# Patient Record
Sex: FEMALE
[De-identification: names, ages, dates, MRNs, and addresses within clinical notes are randomized per-mention and may not be internally consistent; named-entity substitution may affect disease eponyms.]

---

## 2023-03-30 PROBLEM — Z00.00 ENCOUNTER FOR PREVENTIVE HEALTH EXAMINATION: Status: ACTIVE | Noted: 2023-03-30

## 2023-04-13 ENCOUNTER — APPOINTMENT (OUTPATIENT)
Dept: OTOLARYNGOLOGY | Facility: CLINIC | Age: 68
End: 2023-04-13
Payer: MEDICARE

## 2023-04-13 VITALS
HEIGHT: 66 IN | TEMPERATURE: 98 F | DIASTOLIC BLOOD PRESSURE: 103 MMHG | SYSTOLIC BLOOD PRESSURE: 146 MMHG | BODY MASS INDEX: 25.86 KG/M2 | HEART RATE: 60 BPM | WEIGHT: 160.93 LBS

## 2023-04-13 DIAGNOSIS — J01.01 ACUTE RECURRENT MAXILLARY SINUSITIS: ICD-10-CM

## 2023-04-13 PROCEDURE — 31231 NASAL ENDOSCOPY DX: CPT

## 2023-04-13 PROCEDURE — 99203 OFFICE O/P NEW LOW 30 MIN: CPT | Mod: 25

## 2023-04-13 NOTE — PROCEDURE
[FreeTextEntry6] : Procedure performed: Nasal Endoscopy- Diagnostic\par Pre-op indication(s): nasal congestion\par Post-op indication(s): nasal congestion\par Verbal and/or written consent obtained from patient\par Anterior rhinoscopy insufficient to account for symptoms\par Scope #: 3, flexible fiber optic telescope\par The scope was introduced in the nasal passage between the middle and inferior turbinates to exam the inferior portion of the middle meatus and the fontanelle, as well as the maxillary ostia. Next, the scope was passed medically and posteriorly to the middle turbinates to examine the sphenoethmoid recess and the superior turbinate region.\par \par Upon visualization the finders are as follows:\par Nasal Septum: L septal deviation\par Bilateral - Mucosa: boggy turbinates, Mucous: scant, Polyp: not seen, Inferior Turbinate: boggy, Middle Turbinate: normal, Superior Turbinate: normal, Inferior Meatus: narrow, Middle Meatus: narrow, Super Meatus: normal, Sphenoethmoidal Recess: clear\par

## 2023-04-13 NOTE — END OF VISIT
[FreeTextEntry3] : I personally saw and examined the patient in detail. I spoke to FLYNN Lyon regarding the assessment and plan of care.  I preformed the procedures and I reviewed the above assessment and plan of care, and agree. I have made changes in changes in the body of the note where appropriate.

## 2023-04-13 NOTE — HISTORY OF PRESENT ILLNESS
[de-identified] : 68 year old female presents today because dentist saw L maxillary sinus thickening on CT scan. Patient was being evaluated for dental implants and dentist wants to ensure sinuses are ok before proceeding. Patient denies stuffy nose, runny nose, sinus pressure, or sinus infections. Denies allergies. \par Hx of meningioma surgery in 2011 in [place].

## 2023-04-13 NOTE — ASSESSMENT
[FreeTextEntry1] : 68 year old female presents after dentist recommended evaluation of incidental R maxillary sinus thickening on \par CT scan taken prior to dental implant procedure. On exam, diffuse swelling, L septal deviation. \par - advised patient to try to get CT scan for us to review,if no clear mass and looks benign can monitor with another scan in a few months and proceed with dental implants on contralateral side \par - follow up after review of CT scan